# Patient Record
Sex: FEMALE | Race: WHITE | NOT HISPANIC OR LATINO | Employment: FULL TIME | URBAN - METROPOLITAN AREA
[De-identification: names, ages, dates, MRNs, and addresses within clinical notes are randomized per-mention and may not be internally consistent; named-entity substitution may affect disease eponyms.]

---

## 2021-10-04 ENCOUNTER — OFFICE VISIT (OUTPATIENT)
Dept: ORTHOPEDICS | Facility: CLINIC | Age: 36
End: 2021-10-04
Payer: MEDICAID

## 2021-10-04 VITALS — BODY MASS INDEX: 24.8 KG/M2 | HEIGHT: 63 IN | WEIGHT: 140 LBS

## 2021-10-04 DIAGNOSIS — G56.01 CARPAL TUNNEL SYNDROME ON RIGHT: Primary | ICD-10-CM

## 2021-10-04 PROCEDURE — 99204 OFFICE O/P NEW MOD 45 MIN: CPT | Mod: S$PBB,,, | Performed by: ORTHOPAEDIC SURGERY

## 2021-10-04 PROCEDURE — 99999 PR PBB SHADOW E&M-NEW PATIENT-LVL III: ICD-10-PCS | Mod: PBBFAC,,, | Performed by: ORTHOPAEDIC SURGERY

## 2021-10-04 PROCEDURE — 99204 PR OFFICE/OUTPT VISIT, NEW, LEVL IV, 45-59 MIN: ICD-10-PCS | Mod: S$PBB,,, | Performed by: ORTHOPAEDIC SURGERY

## 2021-10-04 PROCEDURE — 99999 PR PBB SHADOW E&M-NEW PATIENT-LVL III: CPT | Mod: PBBFAC,,, | Performed by: ORTHOPAEDIC SURGERY

## 2021-10-04 PROCEDURE — 99203 OFFICE O/P NEW LOW 30 MIN: CPT | Mod: PBBFAC,PN | Performed by: ORTHOPAEDIC SURGERY

## 2021-10-04 RX ORDER — DEXTROAMPHETAMINE SACCHARATE, AMPHETAMINE ASPARTATE MONOHYDRATE, DEXTROAMPHETAMINE SULFATE AND AMPHETAMINE SULFATE 5; 5; 5; 5 MG/1; MG/1; MG/1; MG/1
20 CAPSULE, EXTENDED RELEASE ORAL EVERY MORNING
COMMUNITY

## 2021-10-04 RX ORDER — METFORMIN HYDROCHLORIDE 500 MG/1
500 TABLET ORAL 2 TIMES DAILY WITH MEALS
COMMUNITY

## 2021-10-04 RX ORDER — PANTOPRAZOLE SODIUM 20 MG/1
20 TABLET, DELAYED RELEASE ORAL DAILY
COMMUNITY
End: 2022-12-14

## 2021-10-04 RX ORDER — HYDROCHLOROTHIAZIDE 12.5 MG/1
12.5 CAPSULE ORAL DAILY
COMMUNITY

## 2021-10-04 RX ORDER — LISINOPRIL 20 MG/1
20 TABLET ORAL DAILY
COMMUNITY

## 2021-10-04 RX ADMIN — TRIAMCINOLONE ACETONIDE 40 MG: 40 INJECTION, SUSPENSION INTRA-ARTICULAR; INTRAMUSCULAR at 09:10

## 2021-10-05 RX ORDER — TRIAMCINOLONE ACETONIDE 40 MG/ML
40 INJECTION, SUSPENSION INTRA-ARTICULAR; INTRAMUSCULAR
Status: DISCONTINUED | OUTPATIENT
Start: 2021-10-04 | End: 2021-10-05 | Stop reason: HOSPADM

## 2022-06-10 ENCOUNTER — TELEPHONE (OUTPATIENT)
Dept: ORTHOPEDICS | Facility: CLINIC | Age: 37
End: 2022-06-10
Payer: MEDICAID

## 2022-06-10 NOTE — TELEPHONE ENCOUNTER
----- Message from Arsalan Chau MA sent at 6/10/2022  8:42 AM CDT -----  Contact: Alma Head  [phone], The patient is having spasms in both of her hands and she is an established patient of Dr. Little, she would like to be seen sooner.  Medicaid

## 2022-06-10 NOTE — TELEPHONE ENCOUNTER
Returned call to pt, no answer. Left VMM to return call to clinic if she still wants an appointment.

## 2022-06-14 ENCOUNTER — TELEPHONE (OUTPATIENT)
Dept: ORTHOPEDICS | Facility: CLINIC | Age: 37
End: 2022-06-14
Payer: MEDICAID

## 2022-06-14 NOTE — TELEPHONE ENCOUNTER
----- Message from Alida Stanley MA sent at 6/14/2022  1:59 PM CDT -----  Contact: DELANEY ALLEN [[id number]]  Type: Needs Medical Advice    Who Called:DELANEY ALLEN [[id number]]  Best Call Back Number: [phone]  Inquiry/Question: Would you kindly call DELANEY ALLEN [[id number]] regarding hand injection      Thank you~

## 2022-06-27 ENCOUNTER — OFFICE VISIT (OUTPATIENT)
Dept: ORTHOPEDICS | Facility: CLINIC | Age: 37
End: 2022-06-27
Payer: MEDICAID

## 2022-06-27 VITALS — WEIGHT: 140 LBS | HEIGHT: 63 IN | BODY MASS INDEX: 24.8 KG/M2

## 2022-06-27 DIAGNOSIS — G56.01 CARPAL TUNNEL SYNDROME ON RIGHT: Primary | ICD-10-CM

## 2022-06-27 PROCEDURE — 1160F RVW MEDS BY RX/DR IN RCRD: CPT | Mod: CPTII,,, | Performed by: PHYSICIAN ASSISTANT

## 2022-06-27 PROCEDURE — 20526 PR INJECT CARPAL TUNNEL: ICD-10-PCS | Mod: S$PBB,RT,, | Performed by: PHYSICIAN ASSISTANT

## 2022-06-27 PROCEDURE — 3008F PR BODY MASS INDEX (BMI) DOCUMENTED: ICD-10-PCS | Mod: CPTII,,, | Performed by: PHYSICIAN ASSISTANT

## 2022-06-27 PROCEDURE — 20526 THER INJECTION CARP TUNNEL: CPT | Mod: S$PBB,RT,, | Performed by: PHYSICIAN ASSISTANT

## 2022-06-27 PROCEDURE — 4010F PR ACE/ARB THEARPY RXD/TAKEN: ICD-10-PCS | Mod: CPTII,,, | Performed by: PHYSICIAN ASSISTANT

## 2022-06-27 PROCEDURE — 99214 OFFICE O/P EST MOD 30 MIN: CPT | Mod: S$PBB,25,, | Performed by: PHYSICIAN ASSISTANT

## 2022-06-27 PROCEDURE — 1159F MED LIST DOCD IN RCRD: CPT | Mod: CPTII,,, | Performed by: PHYSICIAN ASSISTANT

## 2022-06-27 PROCEDURE — 1160F PR REVIEW ALL MEDS BY PRESCRIBER/CLIN PHARMACIST DOCUMENTED: ICD-10-PCS | Mod: CPTII,,, | Performed by: PHYSICIAN ASSISTANT

## 2022-06-27 PROCEDURE — 3008F BODY MASS INDEX DOCD: CPT | Mod: CPTII,,, | Performed by: PHYSICIAN ASSISTANT

## 2022-06-27 PROCEDURE — 4010F ACE/ARB THERAPY RXD/TAKEN: CPT | Mod: CPTII,,, | Performed by: PHYSICIAN ASSISTANT

## 2022-06-27 PROCEDURE — 99999 PR PBB SHADOW E&M-EST. PATIENT-LVL III: CPT | Mod: PBBFAC,,, | Performed by: PHYSICIAN ASSISTANT

## 2022-06-27 PROCEDURE — 99999 PR PBB SHADOW E&M-EST. PATIENT-LVL III: ICD-10-PCS | Mod: PBBFAC,,, | Performed by: PHYSICIAN ASSISTANT

## 2022-06-27 PROCEDURE — 1159F PR MEDICATION LIST DOCUMENTED IN MEDICAL RECORD: ICD-10-PCS | Mod: CPTII,,, | Performed by: PHYSICIAN ASSISTANT

## 2022-06-27 PROCEDURE — 99214 PR OFFICE/OUTPT VISIT, EST, LEVL IV, 30-39 MIN: ICD-10-PCS | Mod: S$PBB,25,, | Performed by: PHYSICIAN ASSISTANT

## 2022-06-27 PROCEDURE — 20526 THER INJECTION CARP TUNNEL: CPT | Mod: PBBFAC,PN,RT | Performed by: PHYSICIAN ASSISTANT

## 2022-06-27 PROCEDURE — 99213 OFFICE O/P EST LOW 20 MIN: CPT | Mod: PBBFAC,PN,25 | Performed by: PHYSICIAN ASSISTANT

## 2022-06-27 RX ORDER — PROMETHAZINE HYDROCHLORIDE AND DEXTROMETHORPHAN HYDROBROMIDE 6.25; 15 MG/5ML; MG/5ML
5 SYRUP ORAL EVERY 6 HOURS PRN
COMMUNITY
Start: 2022-05-05 | End: 2022-12-14 | Stop reason: ALTCHOICE

## 2022-06-27 RX ORDER — CITALOPRAM 10 MG/1
TABLET ORAL DAILY
COMMUNITY
Start: 2022-01-14 | End: 2022-12-14

## 2022-06-27 RX ORDER — AMOXICILLIN AND CLAVULANATE POTASSIUM 875; 125 MG/1; MG/1
1 TABLET, FILM COATED ORAL 2 TIMES DAILY
COMMUNITY
Start: 2022-05-05 | End: 2022-12-14 | Stop reason: ALTCHOICE

## 2022-06-27 RX ORDER — AZITHROMYCIN 250 MG/1
TABLET, FILM COATED ORAL
COMMUNITY
Start: 2022-01-05 | End: 2022-10-26

## 2022-06-27 RX ORDER — CETIRIZINE HYDROCHLORIDE 10 MG/1
10 TABLET ORAL NIGHTLY
COMMUNITY
Start: 2022-06-11

## 2022-06-27 RX ORDER — DEXAMETHASONE 4 MG/1
4 TABLET ORAL DAILY
COMMUNITY
Start: 2022-02-17 | End: 2022-12-14 | Stop reason: ALTCHOICE

## 2022-06-27 RX ORDER — PREDNISONE 20 MG/1
20 TABLET ORAL DAILY
COMMUNITY
Start: 2022-01-05 | End: 2022-12-14

## 2022-06-27 RX ORDER — CETIRIZINE HYDROCHLORIDE 1 MG/ML
SOLUTION ORAL
COMMUNITY
Start: 2022-01-05 | End: 2022-12-14

## 2022-06-27 RX ORDER — ALBUTEROL SULFATE 90 UG/1
AEROSOL, METERED RESPIRATORY (INHALATION)
COMMUNITY
Start: 2022-06-11

## 2022-06-27 RX ORDER — TRIAMCINOLONE ACETONIDE 40 MG/ML
40 INJECTION, SUSPENSION INTRA-ARTICULAR; INTRAMUSCULAR
Status: DISCONTINUED | OUTPATIENT
Start: 2022-06-27 | End: 2022-06-27 | Stop reason: HOSPADM

## 2022-06-27 RX ADMIN — TRIAMCINOLONE ACETONIDE 40 MG: 40 INJECTION, SUSPENSION INTRA-ARTICULAR; INTRAMUSCULAR at 09:06

## 2022-06-27 NOTE — PROCEDURES
Carpal Tunnel    Date/Time: 6/27/2022 9:20 AM  Performed by: Trent John PA-C  Authorized by: Trent John PA-C     Consent Done?:  Yes (Verbal)  Indications:  Pain  Site marked: the procedure site was marked    Timeout: prior to procedure the correct patient, procedure, and site was verified    Prep: patient was prepped and draped in usual sterile fashion      Local anesthesia used?: Yes    Local anesthetic:  Lidocaine 1% without epinephrine  Anesthetic total (ml):  0.5    Location:  Wrist (Right carpal tunnel)  Ultrasonic Guidance for Needle Placement?: No    Needle size:  25 G  Approach:  Volar  Medications:  40 mg triamcinolone acetonide 40 mg/mL (20 mg injected)  Patient tolerance:  Patient tolerated the procedure well with no immediate complications

## 2022-06-27 NOTE — PROGRESS NOTES
2022    HPI:  Alma Head is a 36 y.o. female, who presents to clinic today for continued evaluation of her right carpal tunnel syndrome.  States the injection she received 8 months ago at her previous visit provided significant relief to her carpal tunnel symptoms.  States recently her symptoms have returned.  States she is here today to discuss further treatment options.  Denies any other complaints at this time.    PMHX:  Past Medical History:   Diagnosis Date    Acid reflux     Arthritis     Carpal tunnel syndrome     Diabetes mellitus, type 2     Hypertension        PSHX:  Past Surgical History:   Procedure Laterality Date    ANKLE FRACTURE SURGERY       SECTION      X2 -,    ORBITAL FRACTURE SURGERY         FMHX:  History reviewed. No pertinent family history.    SOCHX:  Social History     Tobacco Use    Smoking status: Former Smoker    Smokeless tobacco: Never Used   Substance Use Topics    Alcohol use: Yes       ALLERGIES:  Aspirin    CURRENT MEDICATIONS:  Current Outpatient Medications on File Prior to Visit   Medication Sig Dispense Refill    albuterol (PROVENTIL/VENTOLIN HFA) 90 mcg/actuation inhaler SMARTSI Puff(s) Via Inhaler 4 Times Daily PRN      cetirizine (ZYRTEC) 10 MG tablet Take 10 mg by mouth nightly.      dextroamphetamine-amphetamine (ADDERALL XR) 20 MG 24 hr capsule Take 20 mg by mouth every morning.      hydroCHLOROthiazide (MICROZIDE) 12.5 mg capsule Take 12.5 mg by mouth once daily.      lisinopriL (PRINIVIL,ZESTRIL) 20 MG tablet Take 20 mg by mouth once daily.      metFORMIN (GLUCOPHAGE) 500 MG tablet Take 500 mg by mouth 2 (two) times daily with meals.      amoxicillin-clavulanate 875-125mg (AUGMENTIN) 875-125 mg per tablet Take 1 tablet by mouth 2 (two) times daily.      azithromycin (Z-MICHAEL) 250 MG tablet Take by mouth.      cetirizine (ZYRTEC) 1 mg/mL syrup SMARTSI Teaspoon By Mouth Every Evening PRN      citalopram (CELEXA)  "10 MG tablet Take by mouth once daily.      dexAMETHasone (DECADRON) 4 MG Tab Take 4 mg by mouth once daily.      pantoprazole (PROTONIX) 20 MG tablet Take 20 mg by mouth once daily.      predniSONE (DELTASONE) 20 MG tablet Take 20 mg by mouth once daily.      promethazine-dextromethorphan (PROMETHAZINE-DM) 6.25-15 mg/5 mL Syrp Take 5 mLs by mouth every 6 (six) hours as needed.       No current facility-administered medications on file prior to visit.       REVIEW OF SYSTEMS:  Review of Systems Complete; Negative, unless noted above.    GENERAL PHYSICAL EXAM:   Ht 5' 3" (1.6 m)   Wt 63.5 kg (139 lb 15.9 oz)   BMI 24.80 kg/m²    GEN: well developed, well nourished, no acute distress   PULM: No wheezing, no respiratory distress   CV: RRR    ORTHO EXAM:   Examination of the right wrist/hand reveals no edema, erythema, ecchymosis, or skin breakdown.  Able make composite fist and fully extend all fingers.  Full intact range of motion of the right wrist.  Positive carpal Tinel's test.  Positive Durkan's test.  Reduced sensation in median nerve distribution.  Normal sensation in the radial and ulnar nerve distributions.  Capillary refill less than 2 seconds in all fingers.    RADIOLOGY:   None.    ASSESSMENT:   Right carpal tunnel syndrome    PLAN:  1. I discussed with Alma Head the carpal tunnel syndrome pathology and treatment options in detail during today's visit.  We discussed that surgical intervention is the optimal treatment option at this time, but she stated she is not a position of surgery and would like to have another injection.  We discussed this is a reasonable course of action, but we would need to perform surgical intervention if her symptoms were to return.  She verbally agreed with the treatment plan.    2. Informed consent was obtained.  After an alcohol prep followed by chlorhexidine prep, a steroid injection was placed into the right carpal tunnel.  She tolerated the procedure well " with no immediate complications.    3. I would like to have her follow up in clinic in 3 months with Dr. Little.  She was instructed to contact the clinic for any problems or concerns in the interim.

## 2022-10-25 ENCOUNTER — PATIENT MESSAGE (OUTPATIENT)
Dept: ORTHOPEDICS | Facility: CLINIC | Age: 37
End: 2022-10-25
Payer: MEDICAID

## 2022-10-25 ENCOUNTER — TELEPHONE (OUTPATIENT)
Dept: ORTHOPEDICS | Facility: CLINIC | Age: 37
End: 2022-10-25
Payer: MEDICAID

## 2022-10-25 NOTE — TELEPHONE ENCOUNTER
Called pt to schedule an appointment to discuss sx with . No answer, unable to leave [facility].

## 2022-10-25 NOTE — TELEPHONE ENCOUNTER
Returned call to pt, no answer. Left [facility] to return call to schedule apt with  to discuss sx.

## 2022-10-25 NOTE — TELEPHONE ENCOUNTER
----- Message from Elizabeth Suárez sent at 10/25/2022  9:31 AM CDT -----  Contact: [phone]  Type:  Patient Returning Call    Who Called:  Pt   Who Left Message for Patient:  Rose   Does the patient know what this is regarding?:  Yes   Best Call Back Number:  [phone]    Additional Information:  Lizeth hilda back and advise

## 2022-11-15 ENCOUNTER — TELEPHONE (OUTPATIENT)
Dept: ORTHOPEDICS | Facility: CLINIC | Age: 37
End: 2022-11-15
Payer: MEDICAID

## 2022-11-15 NOTE — TELEPHONE ENCOUNTER
----- Message from Aleta Allen MA sent at 11/15/2022  2:06 PM CST -----  Contact: pt  Wants to schedule surgery   Call back

## 2022-11-30 ENCOUNTER — OFFICE VISIT (OUTPATIENT)
Dept: ORTHOPEDICS | Facility: CLINIC | Age: 37
End: 2022-11-30
Payer: MEDICAID

## 2022-11-30 ENCOUNTER — LAB VISIT (OUTPATIENT)
Dept: LAB | Facility: HOSPITAL | Age: 37
End: 2022-11-30
Attending: ORTHOPAEDIC SURGERY
Payer: MEDICAID

## 2022-11-30 DIAGNOSIS — G56.01 CARPAL TUNNEL SYNDROME ON RIGHT: ICD-10-CM

## 2022-11-30 DIAGNOSIS — G56.01 CARPAL TUNNEL SYNDROME ON RIGHT: Primary | ICD-10-CM

## 2022-11-30 DIAGNOSIS — Z01.818 PREOPERATIVE EXAMINATION: Primary | ICD-10-CM

## 2022-11-30 DIAGNOSIS — Z01.818 PREOPERATIVE EXAMINATION: ICD-10-CM

## 2022-11-30 LAB
BASOPHILS # BLD AUTO: 0.02 K/UL (ref 0–0.2)
BASOPHILS NFR BLD: 0.3 % (ref 0–1.9)
DIFFERENTIAL METHOD: ABNORMAL
EOSINOPHIL # BLD AUTO: 0.2 K/UL (ref 0–0.5)
EOSINOPHIL NFR BLD: 3 % (ref 0–8)
ERYTHROCYTE [DISTWIDTH] IN BLOOD BY AUTOMATED COUNT: 14.7 % (ref 11.5–14.5)
HCT VFR BLD AUTO: 37.9 % (ref 37–48.5)
HGB BLD-MCNC: 12.3 G/DL (ref 12–16)
IMM GRANULOCYTES # BLD AUTO: 0.02 K/UL (ref 0–0.04)
IMM GRANULOCYTES NFR BLD AUTO: 0.3 % (ref 0–0.5)
LYMPHOCYTES # BLD AUTO: 2.1 K/UL (ref 1–4.8)
LYMPHOCYTES NFR BLD: 31 % (ref 18–48)
MCH RBC QN AUTO: 28.9 PG (ref 27–31)
MCHC RBC AUTO-ENTMCNC: 32.5 G/DL (ref 32–36)
MCV RBC AUTO: 89 FL (ref 82–98)
MONOCYTES # BLD AUTO: 0.6 K/UL (ref 0.3–1)
MONOCYTES NFR BLD: 9.2 % (ref 4–15)
NEUTROPHILS # BLD AUTO: 3.8 K/UL (ref 1.8–7.7)
NEUTROPHILS NFR BLD: 56.2 % (ref 38–73)
NRBC BLD-RTO: 0 /100 WBC
PLATELET # BLD AUTO: 305 K/UL (ref 150–450)
PMV BLD AUTO: 10.3 FL (ref 9.2–12.9)
RBC # BLD AUTO: 4.25 M/UL (ref 4–5.4)
WBC # BLD AUTO: 6.75 K/UL (ref 3.9–12.7)

## 2022-11-30 PROCEDURE — 99214 OFFICE O/P EST MOD 30 MIN: CPT | Mod: S$PBB,,, | Performed by: ORTHOPAEDIC SURGERY

## 2022-11-30 PROCEDURE — 80048 BASIC METABOLIC PNL TOTAL CA: CPT | Performed by: ORTHOPAEDIC SURGERY

## 2022-11-30 PROCEDURE — 4010F ACE/ARB THERAPY RXD/TAKEN: CPT | Mod: CPTII,,, | Performed by: ORTHOPAEDIC SURGERY

## 2022-11-30 PROCEDURE — 99214 PR OFFICE/OUTPT VISIT, EST, LEVL IV, 30-39 MIN: ICD-10-PCS | Mod: S$PBB,,, | Performed by: ORTHOPAEDIC SURGERY

## 2022-11-30 PROCEDURE — 85025 COMPLETE CBC W/AUTO DIFF WBC: CPT | Performed by: ORTHOPAEDIC SURGERY

## 2022-11-30 PROCEDURE — 99999 PR PBB SHADOW E&M-EST. PATIENT-LVL II: CPT | Mod: PBBFAC,,, | Performed by: ORTHOPAEDIC SURGERY

## 2022-11-30 PROCEDURE — 99999 PR PBB SHADOW E&M-EST. PATIENT-LVL II: ICD-10-PCS | Mod: PBBFAC,,, | Performed by: ORTHOPAEDIC SURGERY

## 2022-11-30 PROCEDURE — 1159F PR MEDICATION LIST DOCUMENTED IN MEDICAL RECORD: ICD-10-PCS | Mod: CPTII,,, | Performed by: ORTHOPAEDIC SURGERY

## 2022-11-30 PROCEDURE — 4010F PR ACE/ARB THEARPY RXD/TAKEN: ICD-10-PCS | Mod: CPTII,,, | Performed by: ORTHOPAEDIC SURGERY

## 2022-11-30 PROCEDURE — 1159F MED LIST DOCD IN RCRD: CPT | Mod: CPTII,,, | Performed by: ORTHOPAEDIC SURGERY

## 2022-11-30 PROCEDURE — 99212 OFFICE O/P EST SF 10 MIN: CPT | Mod: PBBFAC,PN | Performed by: ORTHOPAEDIC SURGERY

## 2022-11-30 PROCEDURE — 36415 COLL VENOUS BLD VENIPUNCTURE: CPT | Mod: PO | Performed by: ORTHOPAEDIC SURGERY

## 2022-11-30 NOTE — PATIENT INSTRUCTIONS
Surgery Instructions:     Your surgery is scheduled on 12/15/22 at the surgery center: [address], 1st floor, second entrance.    The pre-op department will be in contact with you prior to your procedure to review medications and instructions.       Nothing to eat or drink after midnight prior to day of surgery.    The surgery center will contact you the day prior to surgery to advise you of your arrival time for surgery.     Your post op appointment is scheduled on 12/28/22 @ 10:00.    You will be contacted by an automated text message every morning for for 14 days after your surgery inquiring if you have any COVID symptoms.  If you have any concerns regarding COVID please reply to the text message and then an [facility] On Call Registered Nurse will contact you later that day.

## 2022-11-30 NOTE — PROGRESS NOTES
2022    Chief Complaint:  Chief Complaint   Patient presents with    Right Wrist - Pain       HPI:  Alma Head is a 37 y.o. female, who presents to clinic today she has a history of right carpal tunnel syndrome.  She has had 2 carpal tunnel injections in the past.  She states that the 1st one gave her very good relief the 2nd one only gave her temporary relief.  She is here today for further treatment options.  She is only complaining of right hand numbness and tingling.    PMHX:  Past Medical History:   Diagnosis Date    Acid reflux     Arthritis     Carpal tunnel syndrome     Diabetes mellitus, type 2     Hypertension        PSHX:  Past Surgical History:   Procedure Laterality Date    ANKLE FRACTURE SURGERY       SECTION      X2 -,    ORBITAL FRACTURE SURGERY         FMHX:  History reviewed. No pertinent family history.    SOCHX:  Social History     Tobacco Use    Smoking status: Former    Smokeless tobacco: Never   Substance Use Topics    Alcohol use: Yes       ALLERGIES:  Aspirin    CURRENT MEDICATIONS:  Current Outpatient Medications on File Prior to Visit   Medication Sig Dispense Refill    albuterol (PROVENTIL/VENTOLIN HFA) 90 mcg/actuation inhaler SMARTSI Puff(s) Via Inhaler 4 Times Daily PRN      amoxicillin-clavulanate 875-125mg (AUGMENTIN) 875-125 mg per tablet Take 1 tablet by mouth 2 (two) times daily.      azithromycin (Z-MICHAEL) 250 MG tablet Take 1 tablet (250 mg total) by mouth once daily. Take first 2 tablets together, then 1 every day until finished. 6 tablet 0    cetirizine (ZYRTEC) 1 mg/mL syrup SMARTSI Teaspoon By Mouth Every Evening PRN      cetirizine (ZYRTEC) 10 MG tablet Take 10 mg by mouth nightly.      citalopram (CELEXA) 10 MG tablet Take by mouth once daily.      dexAMETHasone (DECADRON) 4 MG Tab Take 4 mg by mouth once daily.      dextroamphetamine-amphetamine (ADDERALL XR) 20 MG 24 hr capsule Take 20 mg by mouth every morning.       hydroCHLOROthiazide (MICROZIDE) 12.5 mg capsule Take 12.5 mg by mouth once daily.      lisinopriL (PRINIVIL,ZESTRIL) 20 MG tablet Take 20 mg by mouth once daily.      metFORMIN (GLUCOPHAGE) 500 MG tablet Take 500 mg by mouth 2 (two) times daily with meals.      pantoprazole (PROTONIX) 20 MG tablet Take 20 mg by mouth once daily.      predniSONE (DELTASONE) 20 MG tablet Take 20 mg by mouth once daily.      promethazine-dextromethorphan (PROMETHAZINE-DM) 6.25-15 mg/5 mL Syrp Take 5 mLs by mouth every 6 (six) hours as needed.       No current facility-administered medications on file prior to visit.       REVIEW OF SYSTEMS:  ROS    GENERAL PHYSICAL EXAM:   There were no vitals taken for this visit.   GEN: well developed, well nourished, no acute distress   HENT: Normocephalic, atraumatic   EYES: No discharge, conjunctiva normal   NECK: Supple, non-tender   PULM: No wheezing, no respiratory distress   CV: RRR   ABD: Soft, non-tender    ORTHO EXAM:   Examination the right hand and wrist reveals that there is no edema.  There are no major skin changes.  Palpation produces no tenderness.  She does report decreased sensation in the median distribution with intact ulnar and radial sensation.  She has negative Tinel's but a positive Durkan's test.  She has 5/5 thenar muscular strength.  She has a 2+ radial pulse.    RADIOLOGY:   Nerve conduction study has been reviewed.  It is consistent with mild bilateral carpal tunnel syndrome    ASSESSMENT:   Right carpal tunnel syndrome    PLAN:  1. I have discussed treatment options with the patient.  I do feel like she does have some component of cervical radiculopathy.  I have discussed the possibility of carpal tunnel release.  I did discuss the risks and benefits of the procedure.  I discussed the possibility that she may not get a significant improvement due to the cervical radiculopathy.  After discussion of the risks and benefits informed consent has been obtained    2.  Will  proceed with right carpal tunnel release under local anesthesia    3. I will send her for CBC and BMP    4.  Follow-up 2 weeks postoperatively

## 2022-12-01 LAB
ANION GAP SERPL CALC-SCNC: 10 MMOL/L (ref 8–16)
BUN SERPL-MCNC: 8 MG/DL (ref 6–20)
CALCIUM SERPL-MCNC: 8.4 MG/DL (ref 8.7–10.5)
CHLORIDE SERPL-SCNC: 104 MMOL/L (ref 95–110)
CO2 SERPL-SCNC: 22 MMOL/L (ref 23–29)
CREAT SERPL-MCNC: 0.6 MG/DL (ref 0.5–1.4)
EST. GFR  (NO RACE VARIABLE): >60 ML/MIN/1.73 M^2
GLUCOSE SERPL-MCNC: 103 MG/DL (ref 70–110)
POTASSIUM SERPL-SCNC: 3.9 MMOL/L (ref 3.5–5.1)
SODIUM SERPL-SCNC: 136 MMOL/L (ref 136–145)

## 2022-12-15 ENCOUNTER — TELEPHONE (OUTPATIENT)
Dept: ORTHOPEDICS | Facility: CLINIC | Age: 37
End: 2022-12-15
Payer: MEDICAID

## 2022-12-15 NOTE — TELEPHONE ENCOUNTER
"Pt called and stated ," I didn't fee like showing up for surgery this morning." Informed pt I can not reschedule her at this time it is up to  to re-schedule her procedure. Pt verbalized understanding. Trent John PA-C notified.   "

## 2022-12-15 NOTE — TELEPHONE ENCOUNTER
----- Message from Alan Muir MA sent at 12/15/2022  9:44 AM CST -----  Contact: patient  Patient called in and stated she didn't show up for surgery this morning 12/15/22?  No reason given.      Would like a call back at [phone].